# Patient Record
Sex: MALE
[De-identification: names, ages, dates, MRNs, and addresses within clinical notes are randomized per-mention and may not be internally consistent; named-entity substitution may affect disease eponyms.]

---

## 2021-01-26 ENCOUNTER — APPOINTMENT (OUTPATIENT)
Dept: OTOLARYNGOLOGY | Facility: CLINIC | Age: 76
End: 2021-01-26
Payer: MEDICARE

## 2021-01-26 VITALS
SYSTOLIC BLOOD PRESSURE: 132 MMHG | WEIGHT: 182 LBS | OXYGEN SATURATION: 98 % | TEMPERATURE: 98 F | DIASTOLIC BLOOD PRESSURE: 75 MMHG | BODY MASS INDEX: 24.65 KG/M2 | HEIGHT: 72 IN | HEART RATE: 78 BPM

## 2021-01-26 DIAGNOSIS — Z78.9 OTHER SPECIFIED HEALTH STATUS: ICD-10-CM

## 2021-01-26 PROBLEM — Z00.00 ENCOUNTER FOR PREVENTIVE HEALTH EXAMINATION: Status: ACTIVE | Noted: 2021-01-26

## 2021-01-26 PROCEDURE — 92550 TYMPANOMETRY & REFLEX THRESH: CPT

## 2021-01-26 PROCEDURE — 99204 OFFICE O/P NEW MOD 45 MIN: CPT | Mod: 25

## 2021-01-26 PROCEDURE — 92504 EAR MICROSCOPY EXAMINATION: CPT

## 2021-01-26 PROCEDURE — 92557 COMPREHENSIVE HEARING TEST: CPT

## 2021-01-26 PROCEDURE — 99072 ADDL SUPL MATRL&STAF TM PHE: CPT

## 2021-01-26 NOTE — CONSULT LETTER
[Dear  ___] : Dear  [unfilled], [Consult Letter:] : I had the pleasure of evaluating your patient, [unfilled]. [Please see my note below.] : Please see my note below. [Consult Closing:] : Thank you very much for allowing me to participate in the care of this patient.  If you have any questions, please do not hesitate to contact me. [Sincerely,] : Sincerely, [FreeTextEntry3] : FAUSTO Masters Jr, MD, FAAOHNS\par Otolaryngologist\par [facility]

## 2021-01-26 NOTE — HISTORY OF PRESENT ILLNESS
[de-identified] : > 10 years of spinning episodes worsened w/ movement that last for just a few seconds; looking down makes this worse. Around the beginning of the year he had a particularly bad episode with daily episodes since then. His last severe period like this was years ago. No c/o subjective hearing loss or tinnitus. He has had an outside audio but didn't bring it with him. In the past he's been told to limit his salt intake but he doesn't recognize the name Menieres disease. No headaches or other neuro complaints. \par \par Covid-19 screening questions: \par   Sick contacts, recent international travel, shortness of breath, new or productive cough, fevers/chills/night sweats: no\par   COVID-19 testing: none\par   Vaccination: no\par

## 2021-01-26 NOTE — PHYSICAL EXAM
[Binocular Microscopic Exam] : Binocular microscopic exam was performed [Normal] : no rashes [de-identified] : EOMI/PERRL w/ no resting nystagmus; CN 2-12 intact; good smooth pursuit; negative fistula test AU; negative [place] for post & lat canals bilaterally w/ Frenzel goggles; no PHSN and normal [place]i head thrust; negative enhanced Rhomberg; normal Fukuda; unremarkable gait; no dysdiadochokinesia

## 2021-01-26 NOTE — ASSESSMENT
[FreeTextEntry1] : Discussed his results; RTC w/ ABR and VNG. Discussed avoiding driving/operating machinery, staying away from subway platform edges, etc. until no longer dizzy.\par

## 2021-02-04 ENCOUNTER — APPOINTMENT (OUTPATIENT)
Dept: OTOLARYNGOLOGY | Facility: CLINIC | Age: 76
End: 2021-02-04
Payer: MEDICARE

## 2021-02-04 VITALS
DIASTOLIC BLOOD PRESSURE: 81 MMHG | TEMPERATURE: 98 F | HEART RATE: 84 BPM | OXYGEN SATURATION: 96 % | BODY MASS INDEX: 247.65 KG/M2 | WEIGHT: 315 LBS | SYSTOLIC BLOOD PRESSURE: 146 MMHG

## 2021-02-04 PROCEDURE — 99214 OFFICE O/P EST MOD 30 MIN: CPT

## 2021-02-04 PROCEDURE — 92653 AEP NEURODIAGNOSTIC I&R: CPT

## 2021-02-04 PROCEDURE — 99072 ADDL SUPL MATRL&STAF TM PHE: CPT

## 2021-02-04 PROCEDURE — 92540 BASIC VESTIBULAR EVALUATION: CPT

## 2021-02-04 PROCEDURE — 92537 CALORIC VSTBLR TEST W/REC: CPT

## 2021-02-04 NOTE — HISTORY OF PRESENT ILLNESS
[de-identified] : > 10 years of spinning episodes worsened w/ movement that last for just a few seconds; looking down makes this worse. Around the beginning of the year he had a particularly bad episode with daily episodes since then- now f/u an ABR and VNG. His last severe period like this was years ago. No c/o subjective hearing loss or tinnitus. He has had an outside audio but didn't bring it with him. In the past he's been told to limit his salt intake but he doesn't recognize the name Menieres disease. No headaches or other neuro complaints. \par \par Covid-19 screening questions: \par   Sick contacts, recent international travel, shortness of breath, new or productive cough, fevers/chills/night sweats: no\par   COVID-19 testing: none\par   Vaccination: no\par

## 2021-02-04 NOTE — PHYSICAL EXAM
[Normal] : the left middle ear was normal [de-identified] : EOMI/PERRL w/ no resting nystagmus; CN 2-12 intact

## 2021-02-04 NOTE — ASSESSMENT
[FreeTextEntry1] : The VNG results were received after the pt left; msg left to come in to try an Epley for the R side.

## 2021-02-04 NOTE — DATA REVIEWED
[de-identified] : 1/21: R nl, L mild SNHL (small asymmetry). Type A AU, % AU\par today: nl ABR; VNG w/ poss R post canal BPPV

## 2021-02-09 ENCOUNTER — APPOINTMENT (OUTPATIENT)
Dept: OTOLARYNGOLOGY | Facility: CLINIC | Age: 76
End: 2021-02-09
Payer: MEDICARE

## 2021-02-09 VITALS
OXYGEN SATURATION: 97 % | HEART RATE: 79 BPM | TEMPERATURE: 98.2 F | SYSTOLIC BLOOD PRESSURE: 124 MMHG | DIASTOLIC BLOOD PRESSURE: 74 MMHG

## 2021-02-09 DIAGNOSIS — H90.42 SENSORINEURAL HEARING LOSS, UNILATERAL, LEFT EAR, WITH UNRESTRICTED HEARING ON THE CONTRALATERAL SIDE: ICD-10-CM

## 2021-02-09 DIAGNOSIS — H81.11 BENIGN PAROXYSMAL VERTIGO, RIGHT EAR: ICD-10-CM

## 2021-02-09 PROCEDURE — 99213 OFFICE O/P EST LOW 20 MIN: CPT | Mod: 25

## 2021-02-09 PROCEDURE — 99072 ADDL SUPL MATRL&STAF TM PHE: CPT

## 2021-02-09 PROCEDURE — 95992 CANALITH REPOSITIONING PROC: CPT

## 2021-02-09 NOTE — PHYSICAL EXAM
[Normal] : the left middle ear was normal [de-identified] : An Epley maneuver was performed x2 with relief on the second pass.\par

## 2021-02-09 NOTE — ASSESSMENT
[FreeTextEntry1] : Discussed post-epley maneuver care and provided the corresponding handout. RTC further vertigo.\par Discussed reasons to RTC; o/w rpt audio 6 months.

## 2021-02-09 NOTE — HISTORY OF PRESENT ILLNESS
[de-identified] : > 10 years of spinning episodes worsened w/ movement that last for just a few seconds; looking down makes this worse. Around the beginning of the year he had a particularly bad episode with daily episodes since then- now f/u an VNG showing BPPV. His last severe period like this was years ago. No c/o subjective hearing loss or tinnitus. No headaches or other neuro complaints. \par \par Covid-19 screening questions: \par   Sick contacts, recent international travel, shortness of breath, new or productive cough, fevers/chills/night sweats: no\par   COVID-19 testing: none\par   Vaccination: no\par

## 2021-02-09 NOTE — DATA REVIEWED
[de-identified] : 1/21: R nl, L mild SNHL (small asymmetry). Type A AU, % AU\par 1/21: nl ABR; VNG w/ poss R post canal BPPV